# Patient Record
Sex: FEMALE | Race: WHITE | Employment: OTHER | URBAN - METROPOLITAN AREA
[De-identification: names, ages, dates, MRNs, and addresses within clinical notes are randomized per-mention and may not be internally consistent; named-entity substitution may affect disease eponyms.]

---

## 2017-07-11 ENCOUNTER — APPOINTMENT (OUTPATIENT)
Dept: GENERAL RADIOLOGY | Age: 57
End: 2017-07-11
Attending: FAMILY MEDICINE
Payer: COMMERCIAL

## 2017-07-11 ENCOUNTER — HOSPITAL ENCOUNTER (OUTPATIENT)
Age: 57
Discharge: HOME | End: 2017-07-11
Attending: FAMILY MEDICINE
Payer: COMMERCIAL

## 2017-07-11 VITALS
OXYGEN SATURATION: 100 % | TEMPERATURE: 98 F | DIASTOLIC BLOOD PRESSURE: 87 MMHG | HEART RATE: 75 BPM | RESPIRATION RATE: 18 BRPM | SYSTOLIC BLOOD PRESSURE: 134 MMHG

## 2017-07-11 DIAGNOSIS — S90.122A CONTUSION OF LESSER TOE OF LEFT FOOT WITHOUT DAMAGE TO NAIL, INITIAL ENCOUNTER: Primary | ICD-10-CM

## 2017-07-11 PROCEDURE — 99203 OFFICE O/P NEW LOW 30 MIN: CPT

## 2017-07-11 PROCEDURE — 73660 X-RAY EXAM OF TOE(S): CPT | Performed by: FAMILY MEDICINE

## 2017-07-11 NOTE — ED INITIAL ASSESSMENT (HPI)
Per pt \" fell \" out of shoe reports thinks left 5th toe was caught on shoe reports pain and redness to toe since yesterday

## 2017-07-11 NOTE — ED PROVIDER NOTES
Patient Seen in: [facility] [place]    History   Patient presents with:   Toe Pain    Stated Complaint: LEFT TOE PAIN    HPI  50yo female with no significant PMHx presents with left fifth toe pain after tripping in her shoe yeste of foot and toes in tact. Skin: Skin is warm. She is not diaphoretic. No erythema. Nursing note and vitals reviewed. MDM       Final result by uSad Camejo MD (07/11/17 13:08:50)                Impression:    CONCLUSION:   1.  Questionable

## 2018-02-22 ENCOUNTER — HOSPITAL ENCOUNTER (OUTPATIENT)
Age: 58
Discharge: HOME | End: 2018-02-22
Attending: EMERGENCY MEDICINE

## 2018-02-22 VITALS
OXYGEN SATURATION: 100 % | RESPIRATION RATE: 16 BRPM | HEART RATE: 78 BPM | TEMPERATURE: 99 F | SYSTOLIC BLOOD PRESSURE: 142 MMHG | DIASTOLIC BLOOD PRESSURE: 78 MMHG

## 2018-02-22 DIAGNOSIS — S86.911A KNEE STRAIN, RIGHT, INITIAL ENCOUNTER: Primary | ICD-10-CM

## 2018-02-22 PROCEDURE — 99212 OFFICE O/P EST SF 10 MIN: CPT

## 2018-02-22 RX ORDER — PREDNISONE 10 MG/1
60 TABLET ORAL
COMMUNITY

## 2018-02-22 RX ORDER — DOCUSATE SODIUM 100 MG/1
CAPSULE, LIQUID FILLED ORAL
COMMUNITY

## 2018-02-22 RX ORDER — LORAZEPAM 0.5 MG/1
0.5 TABLET ORAL
COMMUNITY
Start: 2017-07-18

## 2018-02-22 NOTE — ED INITIAL ASSESSMENT (HPI)
Pt here with complaints of right knee pain, pt states was going up the stairs to catch the train and heard a pop to her right knee and was unable to bear wt on that leg , pt has a hard time ambulating

## 2018-02-22 NOTE — ED NOTES
Pt discharged home, ace wrap in place , pt refused the crutches, she states she has some at home, pt instructed to follow up with her primary md for further testing

## 2018-02-22 NOTE — ED PROVIDER NOTES
Patient Seen in: [address]    History   Patient presents with:  Lower Extremity Injury (musculoskeletal)    Stated Complaint: KNEE INJURY     HPI    The patient is a 60-year-old female with a history of right knee pain on left knee      ED Course   Labs Reviewed - No data to display    ED Course as of Feb 22 1454  ------------------------------------------------------------       MDM     Patient has a right knee strain which is consistent with a cruciate tear.   We will s

## 2023-11-14 ENCOUNTER — HOSPITAL ENCOUNTER (OUTPATIENT)
Age: 63
Discharge: HOME | End: 2023-11-14
Payer: COMMERCIAL

## 2023-11-14 VITALS
RESPIRATION RATE: 18 BRPM | DIASTOLIC BLOOD PRESSURE: 74 MMHG | OXYGEN SATURATION: 100 % | TEMPERATURE: 99 F | HEART RATE: 64 BPM | SYSTOLIC BLOOD PRESSURE: 141 MMHG

## 2023-11-14 DIAGNOSIS — J02.9 ACUTE VIRAL PHARYNGITIS: ICD-10-CM

## 2023-11-14 DIAGNOSIS — J06.9 VIRAL UPPER RESPIRATORY TRACT INFECTION WITH COUGH: Primary | ICD-10-CM

## 2023-11-14 LAB — S PYO AG THROAT QL: NEGATIVE

## 2023-11-14 PROCEDURE — 99203 OFFICE O/P NEW LOW 30 MIN: CPT | Performed by: PHYSICIAN ASSISTANT

## 2023-11-14 PROCEDURE — 87880 STREP A ASSAY W/OPTIC: CPT | Performed by: PHYSICIAN ASSISTANT

## 2023-11-14 RX ORDER — DEXAMETHASONE 4 MG/1
8 TABLET ORAL ONCE
Status: COMPLETED | OUTPATIENT
Start: 2023-11-14 | End: 2023-11-14

## 2023-11-14 NOTE — ED INITIAL ASSESSMENT (HPI)
Pt here with sore throat, congestion and headache since Sunday. When pt takes a deep breath it makes her cough. Pt has taken 2 covid test at home since Sunday and both negative.

## 2023-11-14 NOTE — DISCHARGE INSTRUCTIONS
Recommendations:    - Mucinex for cough  - Allegra-D or Zyrtec for congestion, post nasal drop  - Flonase for nasal/sinus congestion  - Cepacol Lozenges for sore throat    - Motrin every 6-8 hours as needed for pain/fever  - Tylenol every 4-6 hours as needed for pain/fever  - Rest  - Proper Sleep Regimen  - Increase Water Intake

## 2023-11-22 ENCOUNTER — HOSPITAL ENCOUNTER (OUTPATIENT)
Age: 63
Discharge: HOME | End: 2023-11-22
Payer: COMMERCIAL

## 2023-11-22 VITALS
RESPIRATION RATE: 20 BRPM | TEMPERATURE: 99 F | SYSTOLIC BLOOD PRESSURE: 146 MMHG | HEART RATE: 85 BPM | OXYGEN SATURATION: 99 % | DIASTOLIC BLOOD PRESSURE: 84 MMHG

## 2023-11-22 DIAGNOSIS — J06.9 UPPER RESPIRATORY TRACT INFECTION, UNSPECIFIED TYPE: Primary | ICD-10-CM

## 2023-11-22 PROCEDURE — 99213 OFFICE O/P EST LOW 20 MIN: CPT | Performed by: NURSE PRACTITIONER

## 2023-11-22 RX ORDER — PSEUDOEPHEDRINE HCL 120 MG/1
120 TABLET, FILM COATED, EXTENDED RELEASE ORAL EVERY 12 HOURS PRN
Qty: 10 TABLET | Refills: 0 | Status: SHIPPED | OUTPATIENT
Start: 2023-11-22 | End: 2023-12-22

## 2023-11-22 NOTE — ED INITIAL ASSESSMENT (HPI)
Pt came in due to cough and congestion for the past week. Pt stated she was seen in this [facility] a week ago for same symptoms. Pt stated she tested for covid at home and it was negative. Pt has easy non labored respirations.